# Patient Record
Sex: MALE | Race: WHITE | Employment: FULL TIME | URBAN - METROPOLITAN AREA
[De-identification: names, ages, dates, MRNs, and addresses within clinical notes are randomized per-mention and may not be internally consistent; named-entity substitution may affect disease eponyms.]

---

## 2018-05-24 ENCOUNTER — OFFICE VISIT (OUTPATIENT)
Dept: FAMILY MEDICINE CLINIC | Age: 38
End: 2018-05-24

## 2018-05-24 VITALS
HEART RATE: 79 BPM | WEIGHT: 282 LBS | OXYGEN SATURATION: 96 % | TEMPERATURE: 98.6 F | DIASTOLIC BLOOD PRESSURE: 68 MMHG | BODY MASS INDEX: 41.77 KG/M2 | SYSTOLIC BLOOD PRESSURE: 116 MMHG | HEIGHT: 69 IN

## 2018-05-24 DIAGNOSIS — J02.9 SORE THROAT: Primary | ICD-10-CM

## 2018-05-24 LAB
S PYO AG THROAT QL: NEGATIVE
VALID INTERNAL CONTROL?: YES

## 2018-05-24 NOTE — MR AVS SNAPSHOT
[address] [phone] Patient: Matthias Mora MRN: [id number] [facility]:[birth year] Visit Information Date & Time Provider Department Dept. Phone Encounter #  
 5/24/2018  9:45 AM Isamar Brown MD [address] [place] [address] [phone] [id number] Upcoming Health Maintenance Date Due DTaP/Tdap/Td series (1 - Tdap) 1/18/1999 Influenza Age 5 to Adult 8/1/2018 Allergies as of 5/24/2018  Review Complete On: 5/24/2018 By: Isamar Brown MD  
 No Known Allergies Current Immunizations  Reviewed on 12/28/2015 Name Date Influenza Vaccine (Quad) PF 12/28/2015, 11/3/2014  4:00 PM  
  
 Not reviewed this visit You Were Diagnosed With   
  
 Codes Comments Sore throat    -  Primary ICD-10-CM: J02.9 ICD-9-CM: 130 Vitals BP Pulse Temp Height(growth percentile) Weight(growth percentile) SpO2  
 116/68 (BP 1 Location: Left arm, BP Patient Position: Sitting) 79 98.6 °F (37 °C) (Oral) 5' 9\" (1.753 m) 282 lb (127.9 kg) 96% BMI Smoking Status 41.64 kg/m2 Former Smoker BMI and BSA Data Body Mass Index Body Surface Area  
 41.64 kg/m 2 2.5 m 2 Preferred Pharmacy Pharmacy Name Phone [facility] [place], [address] [place] [facility] [phone] Your Updated Medication List  
  
Notice  As of 5/24/2018  4:34 PM  
 You have not been prescribed any medications. We Performed the Following AMB POC RAPID STREP A [75102 CPT(R)] Introducing [facility]! Josh Green introduces [employer]It patient portal. Now you can access parts of your medical record, email your doctor's office, and request medication refills online. 1. In your internet browser, go to [email or web address]. [email or web address] 2. Click on the First Time User? Click Here link in the Sign In box.  You will see the New Member Sign Up page. 3. Enter your [employer] Access Code exactly as it appears below. You will not need to use this code after youve completed the sign-up process. If you do not sign up before the expiration date, you must request a new code. · [employer] Access Code: [email or web address] Expires: 8/22/2018  9:55 AM 
 
4. Enter the last four digits of your Social Security Number (xxxx) and Date of Birth (mm/dd/yyyy) as indicated and click Submit. You will be taken to the next sign-up page. 5. Create a [employer] ID. This will be your [employer] login ID and cannot be changed, so think of one that is secure and easy to remember. 6. Create a [employer] password. You can change your password at any time. 7. Enter your Password Reset Question and Answer. This can be used at a later time if you forget your password. 8. Enter your e-mail address. You will receive e-mail notification when new information is available in [address]. 9. Click Sign Up. You can now view and download portions of your medical record. 10. Click the Download Summary menu link to download a portable copy of your medical information. If you have questions, please visit the Frequently Asked Questions section of the [employer] website. Remember, [employer] is NOT to be used for urgent needs. For medical emergencies, dial 911. Now available from your iPhone and Android! Please provide this summary of care documentation to your next provider. Your primary care clinician is listed as Kavya Leonard. If you have any questions after today's visit, please call [phone].

## 2018-05-24 NOTE — PROGRESS NOTES
Tarik Betancourt is a 34 y.o. male who presents for sore throat that began yesterday. No fever. No cough. He has developed congestion and rhinorrhea today. No cp/sob. Denies h/o seasonal allergies. No self treatments. Past Medical History:   Diagnosis Date    Nicotine dependence      Meds: none      Allergies:   No Known Allergies    Smoker:  History   Smoking Status    Former Smoker    Packs/day: 0.25    Years: 17.00    Types: Cigarettes    Quit date: 1/1/2015   Smokeless Tobacco    Never Used     Comment: 1 pack a week using a vaporizer        ETOH:   History   Alcohol Use    0.0 oz/week     Comment: beer socially-6 pack / everer week y othweek       FH: History reviewed. No pertinent family history. ROS:  Per HPI    Physical Exam:  Visit Vitals    /68 (BP 1 Location: Left arm, BP Patient Position: Sitting)    Pulse 79    Temp 98.6 °F (37 °C) (Oral)    Ht 5' 9\" (1.753 m)    Wt 282 lb (127.9 kg)    SpO2 96%    BMI 41.64 kg/m2     General: Alert and oriented, in no acute distress. Responds to all questions appropriately. SKIN: No rash. Normal color. HEAD: No sinus tenderness. EYES: Conjunctiva are clear;   EARS: External normal, canals clear, tympanic membranes normal.         OROPHARYNX: Slight tonsil edema, erythema, no exudate. NECK: Supple; no masses; normal lymphadenopathy. LUNGS: Respirations unlabored; clear to auscultation bilaterally, no wheeze, rales or rhonchi. CARDIOVASCULAR: Regular, rate, and rhythm without murmurs, gallops or rubs. EXTREMITIES: No edema, cyanosis or clubbing. NEUROLOGIC: Speech intact; face symmetrical; moves all extremities equally    Lab:  Recent Results (from the past 12 hour(s))   AMB POC RAPID STREP A    Collection Time: 05/24/18 10:12 AM   Result Value Ref Range    VALID INTERNAL CONTROL POC Yes     Group A Strep Ag Negative Negative         Assessment:    ICD-10-CM ICD-9-CM    1.  Sore throat J02.9 462 AMB POC RAPID STREP A    2. URI  Likely viral.    Plan:  Discharge instructions:  1. Combination cough and cold medicine such as Mucinex DM  2. Salt water gargle. 3. Plenty of fluids. 4. Ibuprofen (Motrin, Advil):  200mg - take 1-4 tables three times as needed for pain and fever   5. Acetaminophen (Tylenol):  500mg 1-2 tablets every 6 hours as needed for pain and fever. 6. Throat lozenges such as Halls as needed. 7. Humidifier as needed. Follow Up:  Get re-examined if not improved in  5-7 days or sooner if symptoms worsen.      If you get suddenly worse, go to the nearest hospital Emergency Room

## 2019-03-04 ENCOUNTER — OFFICE VISIT (OUTPATIENT)
Dept: FAMILY MEDICINE CLINIC | Age: 39
End: 2019-03-04

## 2019-03-04 VITALS
SYSTOLIC BLOOD PRESSURE: 102 MMHG | OXYGEN SATURATION: 99 % | BODY MASS INDEX: 42.21 KG/M2 | DIASTOLIC BLOOD PRESSURE: 69 MMHG | HEART RATE: 71 BPM | RESPIRATION RATE: 18 BRPM | TEMPERATURE: 98.3 F | HEIGHT: 69 IN | WEIGHT: 285 LBS

## 2019-03-04 DIAGNOSIS — J06.9 VIRAL UPPER RESPIRATORY TRACT INFECTION: Primary | ICD-10-CM

## 2019-03-04 DIAGNOSIS — J02.9 SORE THROAT: ICD-10-CM

## 2019-03-04 PROBLEM — E66.01 OBESITY, MORBID (HCC): Status: ACTIVE | Noted: 2019-03-04

## 2019-03-04 LAB
MONONUCLEOSIS SCREEN POC: NEGATIVE
S PYO AG THROAT QL: NEGATIVE
VALID INTERNAL CONTROL?: YES
VALID INTERNAL CONTROL?: YES

## 2019-03-04 NOTE — PROGRESS NOTES
Chief Complaint   Patient presents with    Sore Throat     sore throat x 2 days     1. Have you been to the ER, urgent care clinic since your last visit? Hospitalized since your last visit? No    2. Have you seen or consulted any other health care providers outside of the [address] [place] [address] since your last visit? Include any pap smears or colon screening. No     Reviewed record in preparation for visit and have obtained necessary documentation.

## 2019-03-05 NOTE — PATIENT INSTRUCTIONS

## 2019-03-05 NOTE — PROGRESS NOTES
Paty Jacob is a 37 y.o. male here today to address the following issues:  Chief Complaint   Patient presents with    Sore Throat     sore throat x 2 days     Here with complaints of sore throat for 2 days. Improved this afternoon. Also with headache, improved with tylenol. Has congestion. No seasonal allergies. Step kid with flu and mono last week. No hx of asthma or COPD. Past Medical History:   Diagnosis Date    Nicotine dependence      Past Surgical History:   Procedure Laterality Date    HX HERNIA REPAIR       Social History     Socioeconomic History    Marital status: UNKNOWN     Spouse name: Not on file    Number of children: Not on file    Years of education: Not on file    Highest education level: Not on file   Social Needs    Financial resource strain: Not on file    Food insecurity - worry: Not on file    Food insecurity - inability: Not on file   [facility] needs - medical: Not on file   [facility] needs - non-medical: Not on file   Occupational History    Not on file   Tobacco Use    Smoking status: Former Smoker     Packs/day: 0.25     Years: 17.00     Pack years: 4.25     Types: Cigarettes     Last attempt to quit: 2015     Years since quittin.1    Smokeless tobacco: Never Used    Tobacco comment: 1 pack a week using a vaporizer    Substance and Sexual Activity    Alcohol use: Yes     Alcohol/week: 0.0 oz     Comment: beer socially-6 pack / everer week y [employer] Drug use: No     Comment: no recent use per patient    Sexual activity: Yes     Partners: Female     Birth control/protection: None   Other Topics Concern    Not on file   Social History Narrative    Not on file       No Known Allergies    No current outpatient medications on file. No current facility-administered medications for this visit. Review of Systems   Constitutional: Negative for fever. HENT: Positive for congestion and sore throat.  Negative for ear discharge and ear pain. Respiratory: Negative for shortness of breath. Cardiovascular: Negative for chest pain. Gastrointestinal: Negative for abdominal pain, nausea and vomiting. A comprehensive review of systems was negative except for that written in the HPI and listed above. Visit Vitals  /69 (BP 1 Location: Right arm, BP Patient Position: Sitting)   Pulse 71   Temp 98.3 °F (36.8 °C) (Oral)   Resp 18   Ht 5' 9\" (1.753 m)   Wt 285 lb (129.3 kg)   SpO2 99%   BMI 42.09 kg/m²       Physical Exam   Constitutional: He is well-developed, well-nourished, and in no distress. No distress. HENT:   Head: Normocephalic and atraumatic. Right Ear: External ear normal.   Left Ear: External ear normal.   Nose: Nose normal.   Mouth/Throat: Oropharynx is clear and moist. No oropharyngeal exudate. Eyes: Conjunctivae are normal. Right eye exhibits no discharge. Left eye exhibits no discharge. Cardiovascular: Normal rate, regular rhythm and normal heart sounds. Exam reveals no gallop and no friction rub. No murmur heard. Pulmonary/Chest: Effort normal and breath sounds normal. No respiratory distress. He has no wheezes. He has no rales. Abdominal: Soft. Lymphadenopathy:     He has no cervical adenopathy. Neurological: He is alert. Skin: Skin is warm and dry. He is not diaphoretic. Psychiatric: Affect normal.   Nursing note and vitals reviewed. Recent Results (from the past 12 hour(s))   AMB POC RAPID STREP A    Collection Time: 03/04/19  7:00 PM   Result Value Ref Range    VALID INTERNAL CONTROL POC Yes     Group A Strep Ag Negative Negative   POC HETEROPHILE ANTIBODY SCREEN    Collection Time: 03/04/19  7:14 PM   Result Value Ref Range    VALID INTERNAL CONTROL POC Yes     Mononucleosis screen (POC) Negative Negative       1.  Sore throat  - AMB POC RAPID STREP A  - POC HETEROPHILE ANTIBODY SCREEN    2. Viral upper respiratory tract infection  Viral Upper Respiratory Illness  Discussed diagnosis and treatment of viral URIs and the importance of avoiding unnecessary antibiotic therapy. Discussed possible OTC remedies for symptomatic management including decongestants, antihistamines, antitussives, expectorants, Ibuprofen and Tylenol. Advised pt that the usual course and duration is up to 1.5 weeks and an additional 3 days on average in smokers. RTC prn for review if worsening or non-resolving symptoms. Follow-up Disposition:  Return if symptoms worsen or fail to improve.     Terra Wheeler MD, CAQSM, RMSK

## 2019-06-18 ENCOUNTER — OFFICE VISIT (OUTPATIENT)
Dept: FAMILY MEDICINE CLINIC | Age: 39
End: 2019-06-18

## 2019-06-21 ENCOUNTER — OFFICE VISIT (OUTPATIENT)
Dept: FAMILY MEDICINE CLINIC | Age: 39
End: 2019-06-21

## 2019-06-21 VITALS
SYSTOLIC BLOOD PRESSURE: 137 MMHG | OXYGEN SATURATION: 96 % | TEMPERATURE: 98 F | HEIGHT: 69 IN | WEIGHT: 290 LBS | DIASTOLIC BLOOD PRESSURE: 78 MMHG | HEART RATE: 76 BPM | RESPIRATION RATE: 18 BRPM | BODY MASS INDEX: 42.95 KG/M2

## 2019-06-21 DIAGNOSIS — M77.11 LATERAL EPICONDYLITIS OF RIGHT ELBOW: Primary | ICD-10-CM

## 2019-06-21 NOTE — PROGRESS NOTES
Chief Complaint   Patient presents with    Arm Pain     right arm pain near elbow X 2 months.  Pain is increased wihen lifting

## 2019-06-21 NOTE — PROGRESS NOTES
Iggy Morales is a 37 y.o. male who presents for R elow pain ongoing for 2 months. Some radiation to fingers at times. Worsened by any activity that leads to extension. Believes that pt was lifting concrete cylinders with a friend 2 months ago when symptoms initially presented. Has not taken any medications. No HEP. No ice or heat. He tried biofreeze with no benefit. Pt is a . Does not do any heavy lifting at work. PMHx:  Past Medical History:   Diagnosis Date    Nicotine dependence      Meds: None    Allergies:   No Known Allergies    Smoker:  Social History     Tobacco Use   Smoking Status Former Smoker    Packs/day: 0.25    Years: 17.00    Pack years: 4.25    Types: Cigarettes    Last attempt to quit: 2015    Years since quittin.4   Smokeless Tobacco Never Used   Tobacco Comment    1 pack a week using a vaporizer        ETOH:   Social History     Substance and Sexual Activity   Alcohol Use Yes    Alcohol/week: 0.0 oz    Comment: beer socially-6 pack / [place] week y othweek       FH: No family history on file. ROS:  Per HPI    Physical Exam:  Visit Vitals  /78   Pulse 76   Temp 98 °F (36.7 °C)   Resp 18   Ht 5' 9\" (1.753 m)   Wt 290 lb (131.5 kg)   SpO2 96%   BMI 42.83 kg/m²     Gen: NAD  MSK: No deformity of the left elbow. FROM of the elbow and wrist flex/ext. FROM of forearm sup/pronation. Full strength of the wrist and elbow. Pain with resisted wrist extension. Tenderness over the lateral condyle. Ext: Sensation intact in the upper ext. Well perfused and no edema. Skin: No rash. Assessment:    ICD-10-CM ICD-9-CM    1. Lateral epicondylitis of right elbow M77.11 726.32        Plan:    Lateral Epicondylitis exercises shared. Discussed treatment options. He will start wrist splint. HEP. Declined PT but will reconsider if not improving. Medications:   1. Naproxin (Aleve): 220mg 1-2 tablets twice a day PRN.   2. Acetaminophen (Tylenol): 500mg 1-2 tablets every 6 hours as needed for pain. RTC in 4-6 week if not improving.

## 2022-01-21 ENCOUNTER — TELEPHONE (OUTPATIENT)
Dept: FAMILY MEDICINE CLINIC | Age: 42
End: 2022-01-21

## 2022-01-21 NOTE — TELEPHONE ENCOUNTER
Received in-basket message:     Type of Appointment? New Patient/New to Provider  Reason for appointment request? No appointments available during search  Additional Information for Provider? use to be a pt of dr Zeus Clarke and has   seen Dr. Alondra Ocampo in the past. called to make an appt for cold symptoms. Did test negative for COVID. Still has the cough, congestion, body aches   and nausea as well. please call to advise  ---------------------------------------------------------------------------  --------------  CALL BACK INFO  What is the best way for the office to contact you? OK to leave message on   voicemail  Preferred Call Back Phone Number? [phone]      Called pt to schedule a vv appt, but pt did not answer.  Left vm

## 2022-03-19 PROBLEM — E66.01 OBESITY, MORBID (HCC): Status: ACTIVE | Noted: 2019-03-04

## 2024-07-30 ENCOUNTER — OFFICE VISIT (OUTPATIENT)
Age: 44
End: 2024-07-30

## 2024-07-30 VITALS
DIASTOLIC BLOOD PRESSURE: 69 MMHG | HEART RATE: 75 BPM | WEIGHT: 315 LBS | RESPIRATION RATE: 18 BRPM | SYSTOLIC BLOOD PRESSURE: 120 MMHG | BODY MASS INDEX: 47.74 KG/M2 | OXYGEN SATURATION: 96 % | HEIGHT: 68 IN | TEMPERATURE: 98.4 F

## 2024-07-30 DIAGNOSIS — R73.03 PREDIABETES: Primary | ICD-10-CM

## 2024-07-30 DIAGNOSIS — E66.01 CLASS 3 SEVERE OBESITY DUE TO EXCESS CALORIES WITH BODY MASS INDEX (BMI) OF 50.0 TO 59.9 IN ADULT, UNSPECIFIED WHETHER SERIOUS COMORBIDITY PRESENT (HCC): ICD-10-CM

## 2024-07-30 DIAGNOSIS — Z76.89 ENCOUNTER TO ESTABLISH CARE: ICD-10-CM

## 2024-07-30 DIAGNOSIS — Z83.3 FAMILY HISTORY OF DIABETES MELLITUS: ICD-10-CM

## 2024-07-30 SDOH — ECONOMIC STABILITY: FOOD INSECURITY: WITHIN THE PAST 12 MONTHS, YOU WORRIED THAT YOUR FOOD WOULD RUN OUT BEFORE YOU GOT MONEY TO BUY MORE.: PATIENT DECLINED

## 2024-07-30 SDOH — ECONOMIC STABILITY: INCOME INSECURITY: HOW HARD IS IT FOR YOU TO PAY FOR THE VERY BASICS LIKE FOOD, HOUSING, MEDICAL CARE, AND HEATING?: PATIENT DECLINED

## 2024-07-30 SDOH — ECONOMIC STABILITY: FOOD INSECURITY: WITHIN THE PAST 12 MONTHS, THE FOOD YOU BOUGHT JUST DIDN'T LAST AND YOU DIDN'T HAVE MONEY TO GET MORE.: PATIENT DECLINED

## 2024-07-30 SDOH — ECONOMIC STABILITY: HOUSING INSECURITY
IN THE LAST 12 MONTHS, WAS THERE A TIME WHEN YOU DID NOT HAVE A STEADY PLACE TO SLEEP OR SLEPT IN A SHELTER (INCLUDING NOW)?: PATIENT DECLINED

## 2024-07-30 ASSESSMENT — PATIENT HEALTH QUESTIONNAIRE - PHQ9
7. TROUBLE CONCENTRATING ON THINGS, SUCH AS READING THE NEWSPAPER OR WATCHING TELEVISION: NOT AT ALL
10. IF YOU CHECKED OFF ANY PROBLEMS, HOW DIFFICULT HAVE THESE PROBLEMS MADE IT FOR YOU TO DO YOUR WORK, TAKE CARE OF THINGS AT HOME, OR GET ALONG WITH OTHER PEOPLE: NOT DIFFICULT AT ALL
5. POOR APPETITE OR OVEREATING: NOT AT ALL
1. LITTLE INTEREST OR PLEASURE IN DOING THINGS: NOT AT ALL
SUM OF ALL RESPONSES TO PHQ QUESTIONS 1-9: 0
SUM OF ALL RESPONSES TO PHQ QUESTIONS 1-9: 0
4. FEELING TIRED OR HAVING LITTLE ENERGY: NOT AT ALL
3. TROUBLE FALLING OR STAYING ASLEEP: NOT AT ALL
SUM OF ALL RESPONSES TO PHQ QUESTIONS 1-9: 0
SUM OF ALL RESPONSES TO PHQ QUESTIONS 1-9: 0
6. FEELING BAD ABOUT YOURSELF - OR THAT YOU ARE A FAILURE OR HAVE LET YOURSELF OR YOUR FAMILY DOWN: NOT AT ALL
SUM OF ALL RESPONSES TO PHQ9 QUESTIONS 1 & 2: 0
8. MOVING OR SPEAKING SO SLOWLY THAT OTHER PEOPLE COULD HAVE NOTICED. OR THE OPPOSITE, BEING SO FIGETY OR RESTLESS THAT YOU HAVE BEEN MOVING AROUND A LOT MORE THAN USUAL: NOT AT ALL
9. THOUGHTS THAT YOU WOULD BE BETTER OFF DEAD, OR OF HURTING YOURSELF: NOT AT ALL

## 2024-07-30 NOTE — PROGRESS NOTES
Identified pt with two pt identifiers(name and ). Reviewed record in preparation for visit and have obtained necessary documentation.  Chief Complaint   Patient presents with    [facility]        Health Maintenance Due   Topic    Hepatitis B vaccine (1 of 3 - 3-dose series)    COVID-19 Vaccine (1)    Depression Screen     HIV screen     Hepatitis C screen     DTaP/Tdap/Td vaccine (1 - Tdap)    Lipids     Colorectal Cancer Screen        Vitals:    24 1423   BP: 120/69   Site: Right Upper Arm   Position: Sitting   Cuff Size: Large Adult   Pulse: 75   Resp: 18   Temp: 98.4 °F (36.9 °C)   TempSrc: Oral   SpO2: 96%   Weight: (!) 155.9 kg (343 lb 12.8 oz)   Height: 1.73 m (5' 8.11\")         \"Have you been to the ER, urgent care clinic since your last visit?  Hospitalized since your last visit?\"    NO    “Have you seen or consulted any other health care providers outside of [facility] since your last visit?”    NO        “Have you had a colorectal cancer screening such as a colonoscopy/FIT/Cologuard?    NO    No colonoscopy on file  No cologuard on file  No FIT/FOBT on file   No flexible sigmoidoscopy on file         Click Here for Release of Records Request     This patient is accompanied in the office by his self.  I have received verbal consent from Johnny Hallman to discuss any/all medical information while they are present in the room.

## 2024-07-30 NOTE — PROGRESS NOTES
Subjective:   Johnny Hallman is an 44 y.o. male who presents to [facility].  The patient is new to me and to [facility] practice.    HPI  Chief Complaint   Patient presents with    [facility]     Acute concerns:  -No acute concenrs   -Wants to discuss weight management, is interested in medications.   Gaining the weight over the last several years. Has been actively trying to loose the weight . He has tried keto diet and successfully lost the weight however her regained the weight back. Currently trying to do calorie counting.  He is interested in pharmacological treatment in order to be more active. Currently he is not able to exercise a lot due to joint pain.   Has a history of presiabetes.       Goal weight is: 225lbs.    Family hx: Diabetes and hypertension     Social hx:  -Tobacco: trying to quit   -Occasionally use marijuana   -ETOH: socially ( socially, once a week).     Diet: used keto in the past, calorie monitoring   Exercise: Walking 1mile,sometimes goes to the GYM       Allergies - reviewed:   No Known Allergies      Medications - reviewed:  No current outpatient medications on file.     No current facility-administered medications for this visit.         Past Medical History - reviewed:  Past Medical History:   Diagnosis Date    Nicotine dependence          Past Surgical History - reviewed:  Past Surgical History:   Procedure Laterality Date    CHOLECYSTECTOMY      HERNIA REPAIR           Family History - reviewed:  History reviewed. No pertinent family history.      Social History - reviewed:  Social History     Socioeconomic History    Marital status: Unknown     Spouse name: Not on file    Number of children: Not on file    Years of education: Not on file    Highest education level: Not on file   Occupational History    Not on file   Tobacco Use    Smoking status: Former     Current packs/day: 0.00     Average packs/day: 0.3 packs/day for 18.0 years (4.5 ttl pk-yrs)     Types: Cigarettes

## 2024-07-31 LAB
ALBUMIN SERPL-MCNC: 4 G/DL (ref 3.5–5)
ALBUMIN/GLOB SERPL: 1.1 (ref 1.1–2.2)
ALP SERPL-CCNC: 60 U/L (ref 45–117)
ALT SERPL-CCNC: 67 U/L (ref 12–78)
ANION GAP SERPL CALC-SCNC: 4 MMOL/L (ref 5–15)
AST SERPL-CCNC: 40 U/L (ref 15–37)
BILIRUB SERPL-MCNC: 0.5 MG/DL (ref 0.2–1)
BUN SERPL-MCNC: 13 MG/DL (ref 6–20)
BUN/CREAT SERPL: 13 (ref 12–20)
CALCIUM SERPL-MCNC: 9.6 MG/DL (ref 8.5–10.1)
CHLORIDE SERPL-SCNC: 108 MMOL/L (ref 97–108)
CHOLEST SERPL-MCNC: 194 MG/DL
CO2 SERPL-SCNC: 30 MMOL/L (ref 21–32)
CREAT SERPL-MCNC: 0.97 MG/DL (ref 0.7–1.3)
ERYTHROCYTE [DISTWIDTH] IN BLOOD BY AUTOMATED COUNT: 12.8 % (ref 11.5–14.5)
EST. AVERAGE GLUCOSE BLD GHB EST-MCNC: 137 MG/DL
GLOBULIN SER CALC-MCNC: 3.5 G/DL (ref 2–4)
GLUCOSE SERPL-MCNC: 127 MG/DL (ref 65–100)
HBA1C MFR BLD: 6.4 % (ref 4–5.6)
HCT VFR BLD AUTO: 44.1 % (ref 36.6–50.3)
HDLC SERPL-MCNC: 44 MG/DL
HDLC SERPL: 4.4 (ref 0–5)
HGB BLD-MCNC: 14.9 G/DL (ref 12.1–17)
LDLC SERPL CALC-MCNC: 104.2 MG/DL (ref 0–100)
MCH RBC QN AUTO: 29.5 PG (ref 26–34)
MCHC RBC AUTO-ENTMCNC: 33.8 G/DL (ref 30–36.5)
MCV RBC AUTO: 87.3 FL (ref 80–99)
NRBC # BLD: 0 K/UL (ref 0–0.01)
NRBC BLD-RTO: 0 PER 100 WBC
PLATELET # BLD AUTO: 213 K/UL (ref 150–400)
PMV BLD AUTO: 11 FL (ref 8.9–12.9)
POTASSIUM SERPL-SCNC: 4.2 MMOL/L (ref 3.5–5.1)
PROT SERPL-MCNC: 7.5 G/DL (ref 6.4–8.2)
RBC # BLD AUTO: 5.05 M/UL (ref 4.1–5.7)
SODIUM SERPL-SCNC: 142 MMOL/L (ref 136–145)
TRIGL SERPL-MCNC: 229 MG/DL
VLDLC SERPL CALC-MCNC: 45.8 MG/DL
WBC # BLD AUTO: 6.8 K/UL (ref 4.1–11.1)

## 2024-08-01 ENCOUNTER — TELEPHONE (OUTPATIENT)
Age: 44
End: 2024-08-01

## 2024-08-01 PROBLEM — R73.03 PREDIABETES: Status: ACTIVE | Noted: 2024-08-01

## 2024-08-01 PROBLEM — Z83.3 FAMILY HISTORY OF DIABETES MELLITUS: Status: ACTIVE | Noted: 2024-08-01

## 2024-08-06 ENCOUNTER — TELEPHONE (OUTPATIENT)
Facility: CLINIC | Age: 44
End: 2024-08-06

## 2024-08-06 DIAGNOSIS — E66.01 CLASS 3 SEVERE OBESITY DUE TO EXCESS CALORIES WITH SERIOUS COMORBIDITY AND BODY MASS INDEX (BMI) OF 50.0 TO 59.9 IN ADULT (HCC): Primary | ICD-10-CM

## 2024-08-06 DIAGNOSIS — R73.02 IMPAIRED GLUCOSE TOLERANCE: ICD-10-CM

## 2024-08-06 RX ORDER — SEMAGLUTIDE 0.25 MG/.5ML
0.25 INJECTION, SOLUTION SUBCUTANEOUS
Qty: 2 ML | Refills: 0 | Status: SHIPPED | OUTPATIENT
Start: 2024-08-06

## 2024-08-06 NOTE — TELEPHONE ENCOUNTER
I called the patient and discussed the results.   Will try to get Wegovy approved to help with the weight loss. Will gualberto need to do Pre-Auth,the patient was notified.      4:04 PM  8/6/2024  Uma Newsome MD

## 2024-08-07 NOTE — TELEPHONE ENCOUNTER
Returned the call. The patient informed me that he talked to the insurance and they said that additional information will be needed for Wegovy approval. We discussed that likely PA will be indicated so will await the paperwork.    2:20 PM  8/7/2024  Uma Newsome MD     139.9

## 2024-08-07 NOTE — TELEPHONE ENCOUNTER
Justin Newsome,    Patient is requesting that you give him a call back. He did not go into details as to what he needed to talk about.

## 2024-09-27 ENCOUNTER — CLINICAL DOCUMENTATION (OUTPATIENT)
Age: 44
End: 2024-09-27

## 2024-09-27 NOTE — PROGRESS NOTES
Prior Authorization    Prior auth submitted via [email or web address] on 09/04/2024    Insurance Carrier/Provider/Payer: [facility]    Medication Name/ Dosage: Wegovy 0.25mg/0.5mL    Quantity/Day Supply: 2mL/28 Days    DX:  Class 3 severe obesity due to excess calories with serious comorbidity and body mass index (BMI) of 50.0 to 59.9 in adult [E66.01, Z68.43]; Impaired glucose tolerance [R73.02]     Reference Number: [id number]    Outcome: Denied    If Denied Reason for Denial: Per prior authorization determination, authorization was denied because the drug Wegovy is not a benefit on your plan and it is not covered. Medications that are not medically necessary are an exclusion under your plan benefits and are not covered

## 2025-03-04 ENCOUNTER — OFFICE VISIT (OUTPATIENT)
Age: 45
End: 2025-03-04

## 2025-03-04 VITALS
WEIGHT: 315 LBS | DIASTOLIC BLOOD PRESSURE: 70 MMHG | BODY MASS INDEX: 47.74 KG/M2 | HEART RATE: 74 BPM | OXYGEN SATURATION: 90 % | SYSTOLIC BLOOD PRESSURE: 143 MMHG | HEIGHT: 68 IN | TEMPERATURE: 98.1 F | RESPIRATION RATE: 16 BRPM

## 2025-03-04 DIAGNOSIS — R73.02 IMPAIRED GLUCOSE TOLERANCE: ICD-10-CM

## 2025-03-04 DIAGNOSIS — M75.52 BURSITIS OF LEFT SHOULDER: ICD-10-CM

## 2025-03-04 DIAGNOSIS — E11.9 TYPE 2 DIABETES MELLITUS WITHOUT COMPLICATION, WITHOUT LONG-TERM CURRENT USE OF INSULIN (HCC): ICD-10-CM

## 2025-03-04 DIAGNOSIS — E66.813 CLASS 3 SEVERE OBESITY DUE TO EXCESS CALORIES WITH SERIOUS COMORBIDITY AND BODY MASS INDEX (BMI) OF 50.0 TO 59.9 IN ADULT: ICD-10-CM

## 2025-03-04 DIAGNOSIS — E66.01 CLASS 3 SEVERE OBESITY DUE TO EXCESS CALORIES WITH SERIOUS COMORBIDITY AND BODY MASS INDEX (BMI) OF 50.0 TO 59.9 IN ADULT: ICD-10-CM

## 2025-03-04 DIAGNOSIS — M25.512 ACUTE PAIN OF LEFT SHOULDER: Primary | ICD-10-CM

## 2025-03-04 DIAGNOSIS — M70.21 OLECRANON BURSITIS OF RIGHT ELBOW: ICD-10-CM

## 2025-03-04 SDOH — ECONOMIC STABILITY: FOOD INSECURITY: WITHIN THE PAST 12 MONTHS, YOU WORRIED THAT YOUR FOOD WOULD RUN OUT BEFORE YOU GOT MONEY TO BUY MORE.: NEVER TRUE

## 2025-03-04 SDOH — ECONOMIC STABILITY: FOOD INSECURITY: WITHIN THE PAST 12 MONTHS, THE FOOD YOU BOUGHT JUST DIDN'T LAST AND YOU DIDN'T HAVE MONEY TO GET MORE.: NEVER TRUE

## 2025-03-04 ASSESSMENT — PATIENT HEALTH QUESTIONNAIRE - PHQ9
SUM OF ALL RESPONSES TO PHQ QUESTIONS 1-9: 0
SUM OF ALL RESPONSES TO PHQ QUESTIONS 1-9: 0
2. FEELING DOWN, DEPRESSED OR HOPELESS: NOT AT ALL
SUM OF ALL RESPONSES TO PHQ QUESTIONS 1-9: 0
SUM OF ALL RESPONSES TO PHQ QUESTIONS 1-9: 0
1. LITTLE INTEREST OR PLEASURE IN DOING THINGS: NOT AT ALL

## 2025-03-04 NOTE — PROGRESS NOTES
Subjective:   Johnny Hallman is an 45 y.o. male who presents for  evaluation of the left shoulder pain and right elbow pain.    HPI  Chief Complaint   Patient presents with    Shoulder Pain     Left shoulder pain and right elbow pain on going for about 3 months.       1.Left shoulder pain  Has been going on for about 3 months, intermittent. No fall or trauma prior to the onset of the pain. Laying on the left side is aggravating the pain, resting is alleviated it.  The pain is aggravated by laying on the left side. In the past had an issue with rotator cuff but does not remember which shoulder. No numbness or tingling in the hands.    2.Right elbow pain  For about 3 months w/o trauma prior to the onset. No swelling or erythema. Contact with the hard surface aggravates the pain.      3.Weight management  He has been using his wife's \"GLP-1 patch\" for weight loss. Insurance previously did not approved GLP-1 agonist.          Allergies - reviewed:   No Known Allergies      Medications - reviewed:  Current Outpatient Medications   Medication Sig    Tirzepatide 2.5 MG/0.5ML SOAJ Inject 2.5 mg into the skin every 7 days    NONFORMULARY GLP-1 Weight management patches.    Semaglutide-Weight Management (WEGOVY) 0.25 MG/0.5ML SOAJ SC injection Inject 0.25 mg into the skin every 7 days (Patient not taking: Reported on 3/4/2025)     No current facility-administered medications for this visit.         Past Medical History - reviewed:  Past Medical History:   Diagnosis Date    Nicotine dependence          Past Surgical History - reviewed:  Past Surgical History:   Procedure Laterality Date    CHOLECYSTECTOMY      HERNIA REPAIR         Review of Systems   Per HPI         Objective:   BP (!) 143/70   Pulse 74   Temp 98.1 °F (36.7 °C)   Resp 16   Ht 1.73 m (5' 8.11\")   Wt (!) 156.1 kg (344 lb 3.2 oz)   SpO2 90%   BMI 52.17 kg/m²     General appearance - alert, well appearing, and in no distress  Chest - clear to

## 2025-03-05 LAB
ALBUMIN SERPL-MCNC: 4 G/DL (ref 4.1–5.1)
ALP SERPL-CCNC: 53 IU/L (ref 44–121)
ALT SERPL-CCNC: 46 IU/L (ref 0–44)
AST SERPL-CCNC: 23 IU/L (ref 0–40)
BILIRUB SERPL-MCNC: 0.4 MG/DL (ref 0–1.2)
BUN SERPL-MCNC: 12 MG/DL (ref 6–24)
BUN/CREAT SERPL: 14 (ref 9–20)
CALCIUM SERPL-MCNC: 8.9 MG/DL (ref 8.7–10.2)
CHLORIDE SERPL-SCNC: 107 MMOL/L (ref 96–106)
CHOLEST SERPL-MCNC: 160 MG/DL (ref 100–199)
CO2 SERPL-SCNC: 20 MMOL/L (ref 20–29)
CREAT SERPL-MCNC: 0.86 MG/DL (ref 0.76–1.27)
EGFRCR SERPLBLD CKD-EPI 2021: 107 ML/MIN/1.73
GLOBULIN SER CALC-MCNC: 2.7 G/DL (ref 1.5–4.5)
GLUCOSE SERPL-MCNC: 152 MG/DL (ref 70–99)
HBA1C MFR BLD: 7.2 % (ref 4.8–5.6)
HDLC SERPL-MCNC: 39 MG/DL
IMP & REVIEW OF LAB RESULTS: NORMAL
LDLC SERPL CALC-MCNC: 104 MG/DL (ref 0–99)
Lab: NORMAL
POTASSIUM SERPL-SCNC: 4 MMOL/L (ref 3.5–5.2)
PROT SERPL-MCNC: 6.7 G/DL (ref 6–8.5)
SODIUM SERPL-SCNC: 141 MMOL/L (ref 134–144)
TRIGL SERPL-MCNC: 91 MG/DL (ref 0–149)
VLDLC SERPL CALC-MCNC: 17 MG/DL (ref 5–40)

## 2025-03-06 ENCOUNTER — TELEPHONE (OUTPATIENT)
Age: 45
End: 2025-03-06

## 2025-03-06 PROBLEM — E11.9 TYPE 2 DIABETES MELLITUS WITHOUT COMPLICATION, WITHOUT LONG-TERM CURRENT USE OF INSULIN: Status: ACTIVE | Noted: 2025-03-06

## 2025-03-06 NOTE — TELEPHONE ENCOUNTER
Attempted to call the patient x 2 to discuss the LAB results. No answer, LVM. Sent the message via [email or web address].    6:34 PM  3/6/2025  Uma Newsome MD

## 2025-03-21 ENCOUNTER — CLINICAL DOCUMENTATION (OUTPATIENT)
Age: 45
End: 2025-03-21

## 2025-03-21 DIAGNOSIS — E66.01 CLASS 3 SEVERE OBESITY DUE TO EXCESS CALORIES WITH SERIOUS COMORBIDITY AND BODY MASS INDEX (BMI) OF 50.0 TO 59.9 IN ADULT: Primary | ICD-10-CM

## 2025-03-21 DIAGNOSIS — E66.813 CLASS 3 SEVERE OBESITY DUE TO EXCESS CALORIES WITH SERIOUS COMORBIDITY AND BODY MASS INDEX (BMI) OF 50.0 TO 59.9 IN ADULT: Primary | ICD-10-CM

## 2025-03-21 DIAGNOSIS — E11.9 TYPE 2 DIABETES MELLITUS WITHOUT COMPLICATION, WITHOUT LONG-TERM CURRENT USE OF INSULIN: ICD-10-CM

## 2025-03-21 NOTE — PROGRESS NOTES
Received the call form the insurance (Moe) to discuss [id number]. Per Moe, Mounjaro cannot be approved at this point before the patient tried GLP-1 agonist which is reasonable. Given the diabetes associated with obesity with BMI of 52 the patient would benefit from Ozempic (semaglutide). Informed Moe about the choice of Ozempic. She will put PA for that. Will send the script to the pharmacy.  The patient was notified via jiffstore.    6:10 PM  3/21/2025  Uma Newsome MD

## 2025-03-21 NOTE — PROGRESS NOTES
Called [id number] line @ [phone], was informed that the case is under the review. Verified patient information and clinical history. I was informed, the reviewer will look at the case and should contact me within the next 72 business hours. Will update the patient when the information from the interviewer is available.           11:21 AM  3/21/2025  Uma Newsome MD

## 2025-04-16 ENCOUNTER — OFFICE VISIT (OUTPATIENT)
Age: 45
End: 2025-04-16
Payer: COMMERCIAL

## 2025-04-16 VITALS
RESPIRATION RATE: 18 BRPM | SYSTOLIC BLOOD PRESSURE: 120 MMHG | HEIGHT: 68 IN | BODY MASS INDEX: 47.74 KG/M2 | TEMPERATURE: 97.9 F | OXYGEN SATURATION: 95 % | WEIGHT: 315 LBS | DIASTOLIC BLOOD PRESSURE: 69 MMHG | HEART RATE: 65 BPM

## 2025-04-16 DIAGNOSIS — Z91.89 AT RISK FOR SLEEP APNEA: ICD-10-CM

## 2025-04-16 DIAGNOSIS — E66.813 CLASS 3 SEVERE OBESITY DUE TO EXCESS CALORIES WITH SERIOUS COMORBIDITY AND BODY MASS INDEX (BMI) OF 50.0 TO 59.9 IN ADULT: ICD-10-CM

## 2025-04-16 DIAGNOSIS — E11.9 TYPE 2 DIABETES MELLITUS WITHOUT COMPLICATION, WITHOUT LONG-TERM CURRENT USE OF INSULIN: Primary | ICD-10-CM

## 2025-04-16 PROCEDURE — G2211 COMPLEX E/M VISIT ADD ON: HCPCS | Performed by: FAMILY MEDICINE

## 2025-04-16 PROCEDURE — 3051F HG A1C>EQUAL 7.0%<8.0%: CPT | Performed by: FAMILY MEDICINE

## 2025-04-16 PROCEDURE — 99214 OFFICE O/P EST MOD 30 MIN: CPT | Performed by: FAMILY MEDICINE

## 2025-04-16 ASSESSMENT — PATIENT HEALTH QUESTIONNAIRE - PHQ9
1. LITTLE INTEREST OR PLEASURE IN DOING THINGS: NOT AT ALL
SUM OF ALL RESPONSES TO PHQ QUESTIONS 1-9: 0
2. FEELING DOWN, DEPRESSED OR HOPELESS: NOT AT ALL

## 2025-04-16 NOTE — PROGRESS NOTES
Identified pt with two pt identifiers(name and ). Reviewed record in preparation for visit and have obtained necessary documentation.  Chief Complaint   Patient presents with    Weight Management        Health Maintenance Due   Topic    HIV screen     Diabetic Alb to Cr ratio (uACR) test     Diabetic retinal exam     Hepatitis C screen     Hepatitis B vaccine (1 of 3 - 19+ 3-dose series)    DTaP/Tdap/Td vaccine (1 - Tdap)    Pneumococcal 0-49 years Vaccine (1 of 2 - PCV)    Colorectal Cancer Screen     COVID-19 Vaccine (3 - 2024-25 season)       Vitals:    25 1621   BP: 120/69   BP Site: Right Upper Arm   Patient Position: Sitting   BP Cuff Size: Large Adult   Pulse: 65   Resp: 18   Temp: 97.9 °F (36.6 °C)   TempSrc: Oral   SpO2: 95%   Weight: (!) 155.4 kg (342 lb 9.5 oz)   Height: 1.73 m (5' 8.11\")         \"Have you been to the ER, urgent care clinic since your last visit?  Hospitalized since your last visit?\"    NO    “Have you seen or consulted any other health care providers outside of [facility] since your last visit?”    NO      “Have you had a colorectal cancer screening such as a colonoscopy/FIT/Cologuard?    YES - Type: Colonoscopy - Where: Uncertain of practice near [facility] in  Nurse/CMA to request most recent records if not in the chart     No colonoscopy on file  No cologuard on file  No FIT/FOBT on file   No flexible sigmoidoscopy on file         Click Here for Release of Records Request     This patient is accompanied in the office by his self.  I have received verbal consent from Johnny Hallman to discuss any/all medical information while they are present in the room.  
no distress  Chest - clear to auscultation, no wheezes, rales or rhonchi, symmetric air entry  Heart - normal rate, regular rhythm, normal S1, S2, no murmurs, rubs, clicks or gallops      Assessment:   Johnny Hallman is a 45 y.o. male who was seen today for:   Diagnosis Orders   1. Type 2 diabetes mellitus without complication, without long-term current use of insulin  Albumin/Creatinine Ratio, Urine    Semaglutide,0.25 or 0.5MG/DOS, 2 MG/3ML SOPN      2. Class 3 severe obesity due to excess calories with serious comorbidity and body mass index (BMI) of 50.0 to 59.9 in adult  [facility] - Ty Lomax MD, Sleep Medicine, [place]    Semaglutide,0.25 or 0.5MG/DOS, 2 MG/3ML SOPN      3. At risk for sleep apnea  [facility] Ty Ruff MD, Sleep Medicine, [place]              Plan:   Obesity in the setting of newly diagnosed diabetes type 2  with concern for ARTHUR. The patient is tolerating Ozempic 0.25mg well without any side effects. Will increase the dose of Ozempic to 0.5mg and continue for the next 4 week. Discussed getting sleep study for evaluation of ARTHUR possibly contributing to fatigue and obesity and the patient agreed.  Will consider switching Ozempic to Zepbound if ARTHUR is confirmed. The patient will greatly benefit from weight loss to decrease the risk of morbidity and mortality from diabetes  Advised to schedule the appointment with ophthalmologist.   Hemoglobin A1C   Date Value Ref Range Status   03/04/2025 7.2 (H) 4.8 - 5.6 % Final     Comment:                 Prediabetes: 5.7 - 6.4           Diabetes: >6.4           Glycemic control for adults with diabetes: <7.0         Initial weight  344lbs with BMI of 52 kg/m2   Weight today 342 lbs with BMI of 51.92 kg/m2    Return in about 4 weeks (around 5/14/2025) for Weight management .        I have discussed the diagnosis with the patient and the intended plan as seen in the above orders.  The patient has received an after-visit summary and

## 2025-05-15 ENCOUNTER — OFFICE VISIT (OUTPATIENT)
Age: 45
End: 2025-05-15
Payer: COMMERCIAL

## 2025-05-15 VITALS
WEIGHT: 315 LBS | RESPIRATION RATE: 16 BRPM | OXYGEN SATURATION: 95 % | BODY MASS INDEX: 47.74 KG/M2 | DIASTOLIC BLOOD PRESSURE: 58 MMHG | TEMPERATURE: 98.9 F | SYSTOLIC BLOOD PRESSURE: 92 MMHG | HEIGHT: 68 IN | HEART RATE: 69 BPM

## 2025-05-15 DIAGNOSIS — Z91.89 AT RISK FOR SLEEP APNEA: ICD-10-CM

## 2025-05-15 DIAGNOSIS — E11.9 TYPE 2 DIABETES MELLITUS WITHOUT COMPLICATION, WITHOUT LONG-TERM CURRENT USE OF INSULIN (HCC): ICD-10-CM

## 2025-05-15 DIAGNOSIS — E66.813 CLASS 3 SEVERE OBESITY DUE TO EXCESS CALORIES WITH SERIOUS COMORBIDITY AND BODY MASS INDEX (BMI) OF 50.0 TO 59.9 IN ADULT (HCC): Primary | ICD-10-CM

## 2025-05-15 PROCEDURE — G2211 COMPLEX E/M VISIT ADD ON: HCPCS | Performed by: FAMILY MEDICINE

## 2025-05-15 PROCEDURE — 3051F HG A1C>EQUAL 7.0%<8.0%: CPT | Performed by: FAMILY MEDICINE

## 2025-05-15 PROCEDURE — 99214 OFFICE O/P EST MOD 30 MIN: CPT | Performed by: FAMILY MEDICINE

## 2025-05-15 ASSESSMENT — PATIENT HEALTH QUESTIONNAIRE - PHQ9
SUM OF ALL RESPONSES TO PHQ QUESTIONS 1-9: 0
2. FEELING DOWN, DEPRESSED OR HOPELESS: NOT AT ALL
SUM OF ALL RESPONSES TO PHQ QUESTIONS 1-9: 0
1. LITTLE INTEREST OR PLEASURE IN DOING THINGS: NOT AT ALL

## 2025-05-15 NOTE — PROGRESS NOTES
Subjective:   Johnny Hallman is an 45 y.o. male who presents for  weight management.    HPI  Chief Complaint   Patient presents with    Weight Management     He has been tolerating Ozempic 0.5 mg well without any side effects. He did noticed some decrease in his appetite and has been eating smaller portions.   He has been having BM every 2-3 days, has not taking any Miralax.        Allergies - reviewed:   No Known Allergies      Medications - reviewed:  Current Outpatient Medications   Medication Sig    Semaglutide, 1 MG/DOSE, (OZEMPIC) 4 MG/3ML SOPN sc injection Inject 1 mg into the skin every 7 days    Semaglutide,0.25 or 0.5MG/DOS, 2 MG/3ML SOPN Inject 0.5 mg into the skin every 7 days    Tirzepatide 2.5 MG/0.5ML SOAJ Inject 2.5 mg into the skin every 7 days (Patient not taking: Reported on 5/15/2025)    NONFORMULARY GLP-1 Weight management patches. (Patient not taking: Reported on 5/15/2025)     No current facility-administered medications for this visit.         Past Medical History - reviewed:  Past Medical History:   Diagnosis Date    Nicotine dependence      Review of Systems   CONSTITUTIONAL: denies fever. Denies chills.   CARDIOVASCULAR: denies chest pain. Denies palpitations  RESPIRATORY: denies shortness of breath  GI: denies abdominal pain. Denies change in stools.        Objective:   BP (!) 92/58 (BP Site: Left Upper Arm, Patient Position: Sitting, BP Cuff Size: Large Adult)   Pulse 69   Temp 98.9 °F (37.2 °C) (Temporal)   Resp 16   Ht 1.73 m (5' 8.11\")   Wt (!) 153.1 kg (337 lb 9.6 oz)   SpO2 95%   BMI 51.17 kg/m²     General appearance - alert, well appearing, and in no distress  Heart - normal rate, regular rhythm, normal S1, S2, no murmurs, rubs, clicks or gallops        Assessment:   Johnny Hallman is a 45 y.o. male who was seen today for:   Diagnosis Orders   1. Class 3 severe obesity due to excess calories with serious comorbidity and body mass index (BMI) of 50.0 to 59.9 in

## 2025-05-15 NOTE — PROGRESS NOTES
Johnny Hallman is a 45 y.o. male      Chief Complaint   Patient presents with    Weight Management       \"Have you been to the ER, urgent care clinic since your last visit?  Hospitalized since your last visit?\"    NO    “Have you seen or consulted any other health care providers outside of [facility] since your last visit?”    NO    “Have you had a colorectal cancer screening such as a colonoscopy/FIT/Cologuard?    NO ( LAST COLONOSCOPY WAS IN 2023)    No colonoscopy on file  No cologuard on file  No FIT/FOBT on file   No flexible sigmoidoscopy on file             Vitals:    05/15/25 1454   BP: (!) 92/58   BP Site: Left Upper Arm   Patient Position: Sitting   BP Cuff Size: Large Adult   Pulse: 69   Resp: 16   Temp: 98.9 °F (37.2 °C)   TempSrc: Temporal   SpO2: 95%   Weight: (!) 153.1 kg (337 lb 9.6 oz)   Height: 1.73 m (5' 8.11\")            Health Maintenance Due   Topic Date Due    Diabetic foot exam  Never done    HIV screen  Never done    Diabetic Alb to Cr ratio (uACR) test  Never done    Diabetic retinal exam  Never done    Hepatitis C screen  Never done    Hepatitis B vaccine (1 of 3 - 19+ 3-dose series) Never done    DTaP/Tdap/Td vaccine (1 - Tdap) Never done    Pneumococcal 0-49 years Vaccine (1 of 2 - PCV) Never done    Colorectal Cancer Screen  Never done    COVID-19 Vaccine (3 - 2024-25 season) 09/01/2024         Medication Reconciliation completed, changes noted.  Please  Update medication list.

## 2025-06-16 ENCOUNTER — OFFICE VISIT (OUTPATIENT)
Age: 45
End: 2025-06-16
Payer: COMMERCIAL

## 2025-06-16 VITALS
RESPIRATION RATE: 18 BRPM | HEIGHT: 68 IN | BODY MASS INDEX: 47.74 KG/M2 | HEART RATE: 82 BPM | SYSTOLIC BLOOD PRESSURE: 134 MMHG | OXYGEN SATURATION: 96 % | WEIGHT: 315 LBS | TEMPERATURE: 97.8 F | DIASTOLIC BLOOD PRESSURE: 76 MMHG

## 2025-06-16 DIAGNOSIS — E66.813 CLASS 3 SEVERE OBESITY DUE TO EXCESS CALORIES WITH SERIOUS COMORBIDITY AND BODY MASS INDEX (BMI) OF 50.0 TO 59.9 IN ADULT (HCC): ICD-10-CM

## 2025-06-16 DIAGNOSIS — E11.9 TYPE 2 DIABETES MELLITUS WITHOUT COMPLICATION, WITHOUT LONG-TERM CURRENT USE OF INSULIN (HCC): Primary | ICD-10-CM

## 2025-06-16 LAB — HBA1C MFR BLD: 5.8 %

## 2025-06-16 PROCEDURE — G2211 COMPLEX E/M VISIT ADD ON: HCPCS | Performed by: FAMILY MEDICINE

## 2025-06-16 PROCEDURE — 3044F HG A1C LEVEL LT 7.0%: CPT | Performed by: FAMILY MEDICINE

## 2025-06-16 PROCEDURE — 83036 HEMOGLOBIN GLYCOSYLATED A1C: CPT | Performed by: FAMILY MEDICINE

## 2025-06-16 PROCEDURE — 99214 OFFICE O/P EST MOD 30 MIN: CPT | Performed by: FAMILY MEDICINE

## 2025-06-16 ASSESSMENT — PATIENT HEALTH QUESTIONNAIRE - PHQ9
SUM OF ALL RESPONSES TO PHQ QUESTIONS 1-9: 0
1. LITTLE INTEREST OR PLEASURE IN DOING THINGS: NOT AT ALL
SUM OF ALL RESPONSES TO PHQ QUESTIONS 1-9: 0
2. FEELING DOWN, DEPRESSED OR HOPELESS: NOT AT ALL
SUM OF ALL RESPONSES TO PHQ QUESTIONS 1-9: 0
SUM OF ALL RESPONSES TO PHQ QUESTIONS 1-9: 0

## 2025-06-16 NOTE — PROGRESS NOTES
Subjective:   Johnny Hallman is an 45 y.o. male who presents for diabetes follow up .    HPI  Chief Complaint   Patient presents with    Weight Management       He has been using Ozempic 1 mg (2 doses of Ozempic 0.5 mg )for the last 4 weeks and has been tolerating it well without any side effects.  He did not notice specific appetite change after the dose adjustment but has been able to control his appetite.  Denies abdominal pain, nausea or constipation.  -10 lbs since the last visit.     Allergies - reviewed:   No Known Allergies      Medications - reviewed:  Current Outpatient Medications   Medication Sig    semaglutide, 2 MG/DOSE, (OZEMPIC) 8 MG/3ML SOPN sc injection Inject 2 mg into the skin every 7 days    NONFORMULARY GLP-1 Weight management patches. (Patient not taking: Reported on 5/15/2025)     No current facility-administered medications for this visit.         Past Medical History - reviewed:  Past Medical History:   Diagnosis Date    Nicotine dependence      Review of Systems   CONSTITUTIONAL: denies fever. Denies chills.   CARDIOVASCULAR: denies chest pain. Denies palpitations  RESPIRATORY: denies shortness of breath  GI: denies abdominal pain. Denies change in stools. Denies hematochezia/melana        Objective:   /76 (BP Site: Right Upper Arm, Patient Position: Sitting, BP Cuff Size: Large Adult)   Pulse 82   Temp 97.8 °F (36.6 °C) (Oral)   Resp 18   Ht 1.73 m (5' 8.11\")   Wt (!) 148.6 kg (327 lb 9.7 oz)   SpO2 96%   BMI 49.65 kg/m²     General appearance - alert, well appearing, and in no distress  Chest - clear to auscultation, no wheezes, rales or rhonchi, symmetric air entry  Heart - normal rate, regular rhythm, normal S1, S2, no murmurs, rubs, clicks or gallops      Assessment:   Johnny Hallman is a 45 y.o. male who was seen today for:   Diagnosis Orders   1. Type 2 diabetes mellitus without complication, without long-term current use of insulin (HCC)  AMB POC HEMOGLOBIN

## 2025-06-16 NOTE — PROGRESS NOTES
Identified pt with two pt identifiers(name and ). Reviewed record in preparation for visit and have obtained necessary documentation.  Chief Complaint   Patient presents with    Weight Management        Health Maintenance Due   Topic    Diabetic foot exam     HIV screen     Diabetic Alb to Cr ratio (uACR) test     Diabetic retinal exam     Hepatitis C screen     Hepatitis B vaccine (1 of 3 - 19+ 3-dose series)    DTaP/Tdap/Td vaccine (1 - Tdap)    Pneumococcal 0-49 years Vaccine (1 of 2 - PCV)    Colorectal Cancer Screen     COVID-19 Vaccine (3 -  season)       Vitals:    25 1423   BP: 134/76   BP Site: Right Upper Arm   Patient Position: Sitting   BP Cuff Size: Large Adult   Pulse: 82   Resp: 18   Temp: 97.8 °F (36.6 °C)   TempSrc: Oral   SpO2: 96%   Weight: (!) 148.6 kg (327 lb 9.7 oz)   Height: 1.73 m (5' 8.11\")         \"Have you been to the ER, urgent care clinic since your last visit?  Hospitalized since your last visit?\"    NO    “Have you seen or consulted any other health care providers outside of [facility] since your last visit?”    NO      “Have you had a colorectal cancer screening such as a colonoscopy/FIT/Cologuard?    YES - Type: Colonoscopy - Where: [address] Nurse/CMA to request most recent records if not in the chart     No colonoscopy on file  No cologuard on file  No FIT/FOBT on file   No flexible sigmoidoscopy on file         Click Here for Release of Records Request     This patient is accompanied in the office by his self.  I have received verbal consent from Johnny Hallman to discuss any/all medical information while they are present in the room.

## 2025-06-17 ENCOUNTER — RESULTS FOLLOW-UP (OUTPATIENT)
Age: 45
End: 2025-06-17

## 2025-06-17 LAB
ALBUMIN/CREAT UR: 2 MG/G CREAT (ref 0–29)
CREAT UR-MCNC: 224.5 MG/DL
MICROALBUMIN UR-MCNC: 4.2 UG/ML

## 2025-07-01 ENCOUNTER — COMMUNITY OUTREACH (OUTPATIENT)
Dept: FAMILY MEDICINE CLINIC | Age: 45
End: 2025-07-01

## 2025-07-01 NOTE — PROGRESS NOTES
Patient's HM shows they are overdue for Colorectal Screening.   Care Everywhere and  files searched.  No results to attach to order nor HM updated.      Faxed request to Levi [facility]odge at [phone].-no records received.

## 2025-08-27 ENCOUNTER — OFFICE VISIT (OUTPATIENT)
Age: 45
End: 2025-08-27
Payer: COMMERCIAL

## 2025-08-27 VITALS
OXYGEN SATURATION: 95 % | HEART RATE: 75 BPM | SYSTOLIC BLOOD PRESSURE: 116 MMHG | TEMPERATURE: 98 F | RESPIRATION RATE: 18 BRPM | WEIGHT: 312.6 LBS | DIASTOLIC BLOOD PRESSURE: 78 MMHG | BODY MASS INDEX: 47.38 KG/M2 | HEIGHT: 68 IN

## 2025-08-27 DIAGNOSIS — E66.813 CLASS 3 SEVERE OBESITY DUE TO EXCESS CALORIES WITH SERIOUS COMORBIDITY AND BODY MASS INDEX (BMI) OF 50.0 TO 59.9 IN ADULT (HCC): ICD-10-CM

## 2025-08-27 DIAGNOSIS — E11.9 TYPE 2 DIABETES MELLITUS WITHOUT COMPLICATION, WITHOUT LONG-TERM CURRENT USE OF INSULIN (HCC): Primary | ICD-10-CM

## 2025-08-27 PROCEDURE — G2211 COMPLEX E/M VISIT ADD ON: HCPCS | Performed by: FAMILY MEDICINE

## 2025-08-27 PROCEDURE — 99214 OFFICE O/P EST MOD 30 MIN: CPT | Performed by: FAMILY MEDICINE

## 2025-08-27 PROCEDURE — 3044F HG A1C LEVEL LT 7.0%: CPT | Performed by: FAMILY MEDICINE
